# Patient Record
Sex: FEMALE | Race: WHITE
[De-identification: names, ages, dates, MRNs, and addresses within clinical notes are randomized per-mention and may not be internally consistent; named-entity substitution may affect disease eponyms.]

---

## 2017-09-15 ENCOUNTER — HOSPITAL ENCOUNTER (OUTPATIENT)
Dept: HOSPITAL 45 - C.LABSPEC | Age: 42
Discharge: HOME | End: 2017-09-15
Attending: GENERAL PRACTICE
Payer: COMMERCIAL

## 2017-09-15 DIAGNOSIS — F11.90: Primary | ICD-10-CM

## 2017-09-15 LAB
BENZODIAZ UR-MCNC: (no result) UG/L
PCP UR-MCNC: (no result) UG/L

## 2017-09-20 ENCOUNTER — HOSPITAL ENCOUNTER (EMERGENCY)
Dept: HOSPITAL 45 - C.EDB | Age: 42
Discharge: HOME | End: 2017-09-20
Payer: COMMERCIAL

## 2017-09-20 VITALS
WEIGHT: 248.46 LBS | BODY MASS INDEX: 39.93 KG/M2 | HEIGHT: 65.98 IN | HEIGHT: 65.98 IN | BODY MASS INDEX: 39.93 KG/M2 | WEIGHT: 248.46 LBS

## 2017-09-20 VITALS — SYSTOLIC BLOOD PRESSURE: 169 MMHG | OXYGEN SATURATION: 96 % | DIASTOLIC BLOOD PRESSURE: 98 MMHG | HEART RATE: 79 BPM

## 2017-09-20 VITALS — TEMPERATURE: 98.24 F

## 2017-09-20 DIAGNOSIS — Z82.49: ICD-10-CM

## 2017-09-20 DIAGNOSIS — E87.6: ICD-10-CM

## 2017-09-20 DIAGNOSIS — R10.30: Primary | ICD-10-CM

## 2017-09-20 DIAGNOSIS — R59.1: ICD-10-CM

## 2017-09-20 DIAGNOSIS — Z98.51: ICD-10-CM

## 2017-09-20 DIAGNOSIS — F17.200: ICD-10-CM

## 2017-09-20 DIAGNOSIS — F41.9: ICD-10-CM

## 2017-09-20 DIAGNOSIS — Z79.899: ICD-10-CM

## 2017-09-20 LAB
ALP SERPL-CCNC: 116 U/L (ref 45–117)
ALT SERPL-CCNC: 27 U/L (ref 12–78)
ANION GAP SERPL CALC-SCNC: 5 MMOL/L (ref 3–11)
APPEARANCE UR: (no result)
AST SERPL-CCNC: 15 U/L (ref 15–37)
BASOPHILS # BLD: 0.01 K/UL (ref 0–0.2)
BASOPHILS NFR BLD: 0.1 %
BILIRUB UR-MCNC: (no result) MG/DL
BUN SERPL-MCNC: 3 MG/DL (ref 7–18)
BUN/CREAT SERPL: 4.6 (ref 10–20)
CALCIUM SERPL-MCNC: 9.2 MG/DL (ref 8.5–10.1)
CHLORIDE SERPL-SCNC: 101 MMOL/L (ref 98–107)
CO2 SERPL-SCNC: 31 MMOL/L (ref 21–32)
COLOR UR: YELLOW
COMPLETE: YES
CREAT CL PREDICTED SERPL C-G-VRATE: 150.2 ML/MIN
CREAT SERPL-MCNC: 0.64 MG/DL (ref 0.6–1.2)
EOSINOPHIL NFR BLD AUTO: 501 K/UL (ref 130–400)
GLUCOSE SERPL-MCNC: 93 MG/DL (ref 70–99)
HCT VFR BLD CALC: 48.6 % (ref 37–47)
IG%: 0.4 %
IMM GRANULOCYTES NFR BLD AUTO: 20.3 %
LYMPHOCYTES # BLD: 3.18 K/UL (ref 1.2–3.4)
MANUAL MICROSCOPIC REQUIRED?: NO
MCH RBC QN AUTO: 28.9 PG (ref 25–34)
MCHC RBC AUTO-ENTMCNC: 32.1 G/DL (ref 32–36)
MCV RBC AUTO: 90 FL (ref 80–100)
MONOCYTES NFR BLD: 3.8 %
NEUTROPHILS # BLD AUTO: 1 %
NEUTROPHILS NFR BLD AUTO: 74.4 %
NITRITE UR QL STRIP: (no result)
PH UR STRIP: >= 9 [PH] (ref 4.5–7.5)
PMV BLD AUTO: 8.7 FL (ref 7.4–10.4)
POTASSIUM SERPL-SCNC: 3.3 MMOL/L (ref 3.5–5.1)
RBC # BLD AUTO: 5.4 M/UL (ref 4.2–5.4)
REVIEW REQ?: NO
SODIUM SERPL-SCNC: 137 MMOL/L (ref 136–145)
SP GR UR STRIP: 1.02 (ref 1–1.03)
URINE BILL WITH OR WITHOUT MIC: (no result)
URINE EPITHELIAL CELL AUTO: >30 /LPF (ref 0–5)
UROBILINOGEN UR-MCNC: (no result) MG/DL
WBC # BLD AUTO: 15.65 K/UL (ref 4.8–10.8)

## 2017-09-20 NOTE — DIAGNOSTIC IMAGING REPORT
ABD/PELVIS WITHOUT FOR STONE



CLINICAL HISTORY: 41 years-old Female presenting with right flank pain, history

of kidney stones, nausea and vomiting since yesterday. 



TECHNIQUE: Multidetector CT of the abdomen and pelvis was performed without the

use of intravenous contrast. IV contrast: None. A dose lowering technique was

used consistent with the principles of ALARA (as low as reasonably achievable). 



COMPARISON: 5/22/2015.



CT DOSE (mGy.cm): The estimated cumulative dose is 1787.90 mGy.cm.



FINDINGS:



 topogram: Unremarkable.



Lung bases: Mosaic attenuation at the lung bases could suggest small airways

disease. Possible superimposed patchy groundglass in the lower lobes. Minimal

dependent change. Normal heart size. No pericardial or pleural effusion.



Liver: Normal morphology. Density consistent with hepatic steatosis.



Biliary: No gross biliary ductal dilatation allowing for noncontrast technique.

Normal gallbladder.



Pancreas: Mild parenchymal atrophy.



Spleen: Normal noncontrast appearance.



Adrenal glands: Normal noncontrast appearance.



Kidneys and ureters: Nonobstructing 2 mm calculus at the lower pole the left

kidney is unchanged in position since the prior exam. No right renal calculus.

No hydronephrosis. Normal ureters.



Bladder: Mild circumferential bladder wall thickening suggested.



Pelvic organs: Uterus surgically absent. Previously noted left ovarian cyst has

resolved. A right ovarian cyst may be present but is characterized without

intravenous contrast.



Bowel: Postsurgical changes of appendectomy. No bowel obstruction.



Peritoneal cavity: No free fluid or intraperitoneal gas.



Vasculature: Atherosclerosis of the normal caliber abdominal aorta.



Lymph nodes: Multiple prominent upper abdominal, retroperitoneal, external iliac

and inguinal lymph nodes, which are subcentimeter but numerous. The largest

nodes are located in the upper abdomen. An index node in the portacaval region

measures 9 mm in the short axis (series 3 image 192).



Abdominal wall: Normal.



Musculoskeletal: Normal.



IMPRESSION:

1.  The patient is status post appendectomy.



2.  Mild circumferential bladder wall thickening could suggest cystitis.

Correlate with urinalysis.



3.  Numerous prominent lymph nodes throughout the abdomen and pelvis. These are

not pathologically enlarged by CT size criteria. These may be reactive.

Follow-up as clinically warranted.



4.  Nonobstructing 2 mm calculus at the lower pole left kidney.



5.  Hepatic steatosis.



6.  Mosaic attenuation at the lung bases could suggest small airways disease.











Electronically signed by:  Alexis Justice M.D.

9/20/2017 11:04 AM



Dictated Date/Time:  9/20/2017 10:55 AM

## 2017-09-20 NOTE — EMERGENCY ROOM VISIT NOTE
History


Report prepared by Kilo:  Maeve Sánchez


Under the Supervision of:  Dr. Mynor Rojas M.D.


First contact with patient:  09:53


Chief Complaint:  FLANK PAIN


Stated Complaint:  RT KIDNEY PAIN





History of Present Illness


The patient is a 41 year old female who presents to the Emergency Room with 

complaints of constant sharp right back pain beginning yesterday. The patient's 

pain began to radiate to her front this morning. She believes her pain is from 

a kidney stone. She denies having any difficulty urinating and has no blood in 

her urine. The patient's son dropped the patient off at the ED. She denies any 

chance of being pregnant. The patient says she felt warm today but is unsure if 

she has a fever. She had three episodes of vomiting today.  Her pain does not 

radiate down her legs.





   Source of History:  patient


   Onset:  yesterday


   Position:  back (right sided)


   Symptom Intensity:  severe


   Quality:  sharp


   Timing:  constant


   Associated Symptoms:  + vomiting, No urinary symptoms





Review of Systems


See HPI for pertinent positives & negatives. A total of 10 systems reviewed and 

were otherwise negative.





Past Medical & Surgical


Medical Problems:


(1) Anxiety State Nos


(2) Lumbago


(3) Tobacco Use Disorder


Surgical Problems:


(1) Tubal Ligation Status








Family History





Kidney disease


Kidney stones





Social History


Smoking Status:  Current Every Day Smoker


Alcohol Use:  occasionally


Drug Use:  marijuana


Marital Status:  


Housing Status:  lives with family


Occupation Status:  unemployed





Current/Historical Medications


Scheduled


Alprazolam (Xanax), 1 MG PO HS


Carisoprodol (Soma), 350 MG PO QID


Cyanocobalamin (Cyanocobalamin), 1,000 MCG INJ MONTHLY


Multivitamin (Multivitamin), 1 TAB PO QAM





Scheduled PRN


Furosemide (Lasix), 40 MG PO PRN PRN for edema


Oxycodone Ir (Roxicodone Ir), 30 MG PO Q4-6HRS PRN for Pain


Oxycodone Ir (Roxicodone Ir), 5 MG PO Q6H PRN for Breakthrough Pain





Allergies


Coded Allergies:  


     No Known Allergies (Verified , 9/20/17)





Physical Exam


Vital Signs











  Date Time  Temp Pulse Resp B/P (MAP) Pulse Ox O2 Delivery O2 Flow Rate FiO2


 


9/20/17 12:26  79 18 169/98 96   


 


9/20/17 11:07  76 18 151/101 98 Room Air  


 


9/20/17 09:39 36.8 80 20 149/99 95 Room Air  











Physical Exam


The patient is writhing in pain.


Constitutional: Vital signs reviewed.


Eyes: Pupils are equal round reactive to light.  Conjunctiva are noninjected.  


ENT: Pharynx is clear without erythema or exudate.  Mucous membranes are moist.

  Neck supple without meningeal signs.


Respiratory: Clear to auscultation bilaterally.  Breath sounds are equal 

bilaterally. 


Cardiovascular: Regular rate and rhythm.  No rubs or gallops.


GI: Soft, nondistended.  Bowel sounds are present. Right lower quadrant 

tenderness, no guarding. Mild right CVA tenderness.


Musculoskeletal: No peripheral edema.  No lower extremity tenderness. 


Integumentary: No cyanosis.


Neurological: The patient is awake and alert.  No focal deficits.


Psychiatric: Normal affect.





Medical Decision & Procedures


ER Provider


Diagnostic Interpretation:


Radiology results as stated below per my review and the radiologist's 

interpretation:





ABD/PELVIS WITHOUT FOR STONE








FINDINGS:





 topogram: Unremarkable.





Lung bases: Mosaic attenuation at the lung bases could suggest small airways


disease. Possible superimposed patchy groundglass in the lower lobes. Minimal


dependent change. Normal heart size. No pericardial or pleural effusion.





Liver: Normal morphology. Density consistent with hepatic steatosis.





Biliary: No gross biliary ductal dilatation allowing for noncontrast technique.


Normal gallbladder.





Pancreas: Mild parenchymal atrophy.





Spleen: Normal noncontrast appearance.





Adrenal glands: Normal noncontrast appearance.





Kidneys and ureters: Nonobstructing 2 mm calculus at the lower pole the left


kidney is unchanged in position since the prior exam. No right renal calculus.


No hydronephrosis. Normal ureters.





Bladder: Mild circumferential bladder wall thickening suggested.





Pelvic organs: Uterus surgically absent. Previously noted left ovarian cyst has


resolved. A right ovarian cyst may be present but is characterized without


intravenous contrast.





Bowel: Postsurgical changes of appendectomy. No bowel obstruction.





Peritoneal cavity: No free fluid or intraperitoneal gas.





Vasculature: Atherosclerosis of the normal caliber abdominal aorta.





Lymph nodes: Multiple prominent upper abdominal, retroperitoneal, external iliac


and inguinal lymph nodes, which are subcentimeter but numerous. The largest


nodes are located in the upper abdomen. An index node in the portacaval region


measures 9 mm in the short axis (series 3 image 192).





Abdominal wall: Normal.





Musculoskeletal: Normal.





IMPRESSION:


1.  The patient is status post appendectomy.





2.  Mild circumferential bladder wall thickening could suggest cystitis.


Correlate with urinalysis.





3.  Numerous prominent lymph nodes throughout the abdomen and pelvis. These are


not pathologically enlarged by CT size criteria. These may be reactive.


Follow-up as clinically warranted.





4.  Nonobstructing 2 mm calculus at the lower pole left kidney.





5.  Hepatic steatosis.





6.  Mosaic attenuation at the lung bases could suggest small airways disease.





Electronically signed by:  Alexis Justice M.D.





Laboratory Results


9/20/17 09:55








Red Blood Count 5.40, Mean Corpuscular Volume 90.0, Mean Corpuscular Hemoglobin 

28.9, Mean Corpuscular Hemoglobin Concent 32.1, Mean Platelet Volume 8.7, 

Neutrophils (%) (Auto) 74.4, Lymphocytes (%) (Auto) 20.3, Monocytes (%) (Auto) 

3.8, Eosinophils (%) (Auto) 1.0, Basophils (%) (Auto) 0.1, Neutrophils # (Auto) 

11.65, Lymphocytes # (Auto) 3.18, Monocytes # (Auto) 0.59, Eosinophils # (Auto) 

0.16, Basophils # (Auto) 0.01





9/20/17 09:55

















Test


  9/20/17


09:55 9/20/17


10:00


 


White Blood Count


  15.65 K/uL


(4.8-10.8) 


 


 


Red Blood Count


  5.40 M/uL


(4.2-5.4) 


 


 


Hemoglobin


  15.6 g/dL


(12.0-16.0) 


 


 


Hematocrit 48.6 % (37-47)  


 


Mean Corpuscular Volume


  90.0 fL


() 


 


 


Mean Corpuscular Hemoglobin


  28.9 pg


(25-34) 


 


 


Mean Corpuscular Hemoglobin


Concent 32.1 g/dl


(32-36) 


 


 


Platelet Count


  501 K/uL


(130-400) 


 


 


Mean Platelet Volume


  8.7 fL


(7.4-10.4) 


 


 


Neutrophils (%) (Auto) 74.4 %  


 


Lymphocytes (%) (Auto) 20.3 %  


 


Monocytes (%) (Auto) 3.8 %  


 


Eosinophils (%) (Auto) 1.0 %  


 


Basophils (%) (Auto) 0.1 %  


 


Neutrophils # (Auto)


  11.65 K/uL


(1.4-6.5) 


 


 


Lymphocytes # (Auto)


  3.18 K/uL


(1.2-3.4) 


 


 


Monocytes # (Auto)


  0.59 K/uL


(0.11-0.59) 


 


 


Eosinophils # (Auto)


  0.16 K/uL


(0-0.5) 


 


 


Basophils # (Auto)


  0.01 K/uL


(0-0.2) 


 


 


RDW Standard Deviation


  45.0 fL


(36.4-46.3) 


 


 


RDW Coefficient of Variation


  13.8 %


(11.5-14.5) 


 


 


Immature Granulocyte % (Auto) 0.4 %  


 


Immature Granulocyte # (Auto)


  0.06 K/uL


(0.00-0.02) 


 


 


Anion Gap


  5.0 mmol/L


(3-11) 


 


 


Est Creatinine Clear Calc


Drug Dose 150.2 ml/min 


  


 


 


Estimated GFR (


American) 130.3 


  


 


 


Estimated GFR (Non-


American 112.4 


  


 


 


BUN/Creatinine Ratio 4.6 (10-20)  


 


Calcium Level


  9.2 mg/dl


(8.5-10.1) 


 


 


Total Bilirubin


  0.5 mg/dl


(0.2-1) 


 


 


Direct Bilirubin  mg/dl (0-0.2)  


 


Aspartate Amino Transf


(AST/SGOT) 15 U/L (15-37) 


  


 


 


Alanine Aminotransferase


(ALT/SGPT) 27 U/L (12-78) 


  


 


 


Alkaline Phosphatase


  116 U/L


() 


 


 


Total Protein


  8.4 gm/dl


(6.4-8.2) 


 


 


Albumin


  4.0 gm/dl


(3.4-5.0) 


 


 


Lipase


  52 U/L


() 


 


 


Chemistry Specimen Hemolysis   


 


Urine Color  YELLOW 


 


Urine Appearance  TURBID (CLEAR) 


 


Urine pH


  


  >= 9.0


(4.5-7.5)


 


Urine Specific Gravity


  


  1.016


(1.000-1.030)


 


Urine Protein  NEG (NEG) 


 


Urine Glucose (UA)  NEG (NEG) 


 


Urine Ketones  NEG (NEG) 


 


Urine Occult Blood  NEG (NEG) 


 


Urine Nitrite  NEG (NEG) 


 


Urine Bilirubin  NEG (NEG) 


 


Urine Urobilinogen  NEG (NEG) 


 


Urine Leukocyte Esterase  NEG (NEG) 


 


Urine WBC (Auto)  1-5 /hpf (0-5) 


 


Urine RBC (Auto)  0-4 /hpf (0-4) 


 


Urine Hyaline Casts (Auto)  0 /lpf (0-5) 


 


Urine Epithelial Cells (Auto)  >30 /lpf (0-5) 


 


Urine Bacteria (Auto)  NEG (NEG) 


 


Urine Pregnancy Test  NEG (NEG) 











Medications Administered











 Medications


  (Trade)  Dose


 Ordered  Sig/Doron


 Route  Start Time


 Stop Time Status Last Admin


Dose Admin


 


 Morphine Sulfate


  (MoRPHine


 SULFATE INJ)  4 mg  ONE  STAT


 IV  9/20/17 10:09


 9/20/17 10:11 DC 9/20/17 10:18


4 MG


 


 Ondansetron HCl


  (Zofran Inj)  4 mg  NOW  STAT


 IV  9/20/17 10:09


 9/20/17 10:11 DC 9/20/17 10:18


4 MG


 


 Sodium Chloride  1,000 ml @ 


 999 mls/hr  Q1H1M STAT


 IV  9/20/17 10:09


 9/20/17 11:09 DC 9/20/17 10:18


999 MLS/HR


 


 Ketorolac


 Tromethamine


  (Toradol Inj)  10 mg  NOW  STAT


 IV  9/20/17 11:12


 9/20/17 11:14 DC 9/20/17 11:19


10 MG











ED Course


0955: The patient was evaluated in room B5. A complete history and physical 

exam was performed.





1009: Sodium Chloride 1000 ml @ 999 mls/hr IV, Zofran Inj 4 mg IV, Morphine 

Sulfate 4 mg IV.





1105: I reevaluated the patient and discussed her test results. She is still in 

pain.





1112: Toradol Inj 10 mg IV.





1210: The patient is feeling better. I advised her to follow up with her PCP.





1217: Upon reevaluation, the patient appeared to have improvement of her 

symptoms. I discussed tonight's findings with her. She verbalized agreement of 

the treatment plan. The patient was discharged home.





Medical Decision


This is a 41-year-old female presents with right flank pain.  Differential 

diagnosis includes ureterolithiasis, hydronephrosis, musculoskeletal pain, 

ectopic pregnancy, UTI.  I did perform a limited focused review of portions of 

the patient's old chart on the electronic medical record. The patient was seen 

in the ED September 2016. She was told she had an intrarenal kidney stone at 

[facility]. The patient came to [facility] ED and had KUB which was unremarkable. 





I did evaluate the patient as noted above. The patient is presenting with right 

flank pain which she states feels identical to kidney stone pain.  She does 

have tenderness in the right lower quadrant. IV access was established.  I did 

treat patient with IV morphine and Zofran.  She is also given normal saline IV.

  I did order and personally review the patient's urine analysis as described 

above.  There is no blood or signs of infection.  Urine pregnancy test is 

negative.  I did order and review the patient's blood work as noted in the 

electronic medical record.  Her white blood cell count is elevated.  Potassium 

is slightly decreased.  I did order a CT of the abdomen and pelvis.  I did 

review the images myself as well as the radiology report as described above.  

There is no evidence of acute process.  She does have some abdominal 

lymphadenopathy which does appear to be reactive.  She has a left intrarenal 

stone but no ureteral calculi.  I did reassess the patient and discussed the 

test results with her in detail.  I did treat her with Toradol 10 mg IV.  On 

reassessment the patient states she feels better.  She still has some pain but 

Toradol seemed to improve her symptoms more than the morphine.  I did recommend 

she follow closely with her doctor for further evaluation.  She was discharged 

in good condition.  She does have oxycodone at home for pain as needed.





[place] Drug Monitoring Program


Search Results:  patient reviewed within database


Drug Monitoring Findings:


The patient received two prescriptions of 5mg and 30 mg 70 count oxycodone on 

September 15th as well as a Soma.





Medication Reconcilliation


Current Medication List:  was personally reviewed by me





Blood Pressure Screening


Patient's blood pressure:  Elevated blood pressure


Blood pressure disposition:  Referred to PCP





Impression





 Primary Impression:  


 Right flank pain


 Additional Impressions:  


 Abdominal lymphadenopathy


 Hypokalemia





Scribe Attestation


The scribe's documentation has been prepared under my direct and personally 

reviewed by me in its entirety. I confirm that the note above accurately 

reflects all work, treatment, procedures, and medical decision making performed 

by me.





Departure Information


Dispostion


Home / Self-Care





Referrals


No Doctor, Assigned (PCP)





Forms


HOME CARE DOCUMENTATION FORM,                                                 

               IMPORTANT VISIT INFORMATION





Patient Instructions


ED Flank Pain Uncertain Cause, My [facility]





Additional Instructions





You have been examined and treated today on an emergency basis only. This is 

not a substitute for, or an effort to provide, complete comprehensive medical 

care. It is impossible to recognize and treat all injuries or illnesses in a 

single emergency department visit. It is therefore important that you follow up 

closely with your physician.  Call as soon as possible for an appointment.  

Return for worsening symptoms or if you develop fever, problems urinating, or 

any other concerning symptoms.





Problem Qualifiers

## 2018-02-10 ENCOUNTER — HOSPITAL ENCOUNTER (EMERGENCY)
Dept: HOSPITAL 45 - C.EDB | Age: 43
Discharge: HOME | End: 2018-02-10
Payer: COMMERCIAL

## 2018-02-10 VITALS
WEIGHT: 235.45 LBS | BODY MASS INDEX: 37.84 KG/M2 | BODY MASS INDEX: 37.84 KG/M2 | HEIGHT: 65.98 IN | HEIGHT: 65.98 IN | WEIGHT: 235.45 LBS

## 2018-02-10 VITALS — SYSTOLIC BLOOD PRESSURE: 139 MMHG | DIASTOLIC BLOOD PRESSURE: 92 MMHG | HEART RATE: 75 BPM | OXYGEN SATURATION: 97 %

## 2018-02-10 VITALS — TEMPERATURE: 98.06 F

## 2018-02-10 DIAGNOSIS — F41.9: ICD-10-CM

## 2018-02-10 DIAGNOSIS — Z79.899: ICD-10-CM

## 2018-02-10 DIAGNOSIS — E66.9: ICD-10-CM

## 2018-02-10 DIAGNOSIS — F17.200: ICD-10-CM

## 2018-02-10 DIAGNOSIS — N83.202: Primary | ICD-10-CM

## 2018-02-10 DIAGNOSIS — Z84.1: ICD-10-CM

## 2018-02-10 LAB
ALBUMIN SERPL-MCNC: 3.8 GM/DL (ref 3.4–5)
ALP SERPL-CCNC: 94 U/L (ref 45–117)
ALT SERPL-CCNC: 14 U/L (ref 12–78)
AST SERPL-CCNC: 7 U/L (ref 15–37)
BASOPHILS # BLD: 0.01 K/UL (ref 0–0.2)
BASOPHILS NFR BLD: 0.2 %
BUN SERPL-MCNC: 5 MG/DL (ref 7–18)
CALCIUM SERPL-MCNC: 9 MG/DL (ref 8.5–10.1)
CO2 SERPL-SCNC: 27 MMOL/L (ref 21–32)
CREAT SERPL-MCNC: 0.62 MG/DL (ref 0.6–1.2)
EOS ABS #: 0.15 K/UL (ref 0–0.5)
EOSINOPHIL NFR BLD AUTO: 312 K/UL (ref 130–400)
GLUCOSE SERPL-MCNC: 96 MG/DL (ref 70–99)
HCT VFR BLD CALC: 41.8 % (ref 37–47)
HGB BLD-MCNC: 15.3 G/DL (ref 12–16)
IG#: 0 K/UL (ref 0–0.02)
IMM GRANULOCYTES NFR BLD AUTO: 21.8 %
LYMPHOCYTES # BLD: 1.38 K/UL (ref 1.2–3.4)
MCH RBC QN AUTO: 32.5 PG (ref 25–34)
MCHC RBC AUTO-ENTMCNC: 36.6 G/DL (ref 32–36)
MCV RBC AUTO: 88.7 FL (ref 80–100)
MONO ABS #: 0.37 K/UL (ref 0.11–0.59)
MONOCYTES NFR BLD: 5.8 %
NEUT ABS #: 4.43 K/UL (ref 1.4–6.5)
NEUTROPHILS # BLD AUTO: 2.4 %
NEUTROPHILS NFR BLD AUTO: 69.8 %
PMV BLD AUTO: 9.3 FL (ref 7.4–10.4)
POTASSIUM SERPL-SCNC: 3.6 MMOL/L (ref 3.5–5.1)
PROT SERPL-MCNC: 7.6 GM/DL (ref 6.4–8.2)
RED CELL DISTRIBUTION WIDTH CV: 13.4 % (ref 11.5–14.5)
RED CELL DISTRIBUTION WIDTH SD: 43.6 FL (ref 36.4–46.3)
SODIUM SERPL-SCNC: 141 MMOL/L (ref 136–145)
WBC # BLD AUTO: 6.34 K/UL (ref 4.8–10.8)

## 2018-02-10 NOTE — DIAGNOSTIC IMAGING REPORT
PELVIC COMPLETE NON OB



CLINICAL HISTORY: 42 years-old Female presenting with RLQ pain, s/p appy,

hyster.  Ovarian cyst, status post partial hysterectomy. 



TECHNIQUE: Real-time grayscale and color and spectral Doppler ultrasound imaging

of the pelvis was performed first using a transabdominal probe and subsequently

transvaginal for better characterization. 



COMPARISON: 2/7/2007 and CT from 9/20/2017.



FINDINGS: 

Uterus: The patient is status post hysterectomy.  



Right adnexa: Right ovary contains 2 adjacent simple appearing cysts one

measuring 2.4 x 2.1 x 1.7 cm and the second measuring 2.3 x 1.2 x 1.4 cm. Right

ovary measures 3.9 x 2.4 x 2.7 cm. Normal color Doppler flow and arterial and

venous waveforms within the ovarian parenchyma.    



Left adnexa: Left ovary expanded by a simple appearing anechoic 4.4 x 2.8 x 2.7

cm. Left ovary measures 5.1 x 3.4 x 3.3 cm. Normal color Doppler flow and

arterial and venous waveforms within the ovarian parenchyma.    



Other: No free fluid.



IMPRESSION:  

1.  Bilateral simple appearing cysts the largest in the left ovary measuring 5.1

cm. These are almost certainly benign in the premenopausal setting. Follow-up

ultrasound in 6-12 weeks recommended by the Society of radiologists in

ultrasound criteria. No evidence of ovarian torsion.



2.  Status post hysterectomy.







Electronically signed by:  Alexis Justice M.D.

2/10/2018 10:45 AM



Dictated Date/Time:  2/10/2018 10:40 AM

## 2018-02-10 NOTE — EMERGENCY ROOM VISIT NOTE
History


Report prepared by Kilo:  Lisa Osorio


Under the Supervision of:  Dr. Mary Kay Mas M.D.


First contact with patient:  08:26


Chief Complaint:  ABDOMINAL PAIN


Stated Complaint:  PAIN IN LOWER STOMACH


Nursing Triage Summary:  


Patient presents with right lower quadrant pain since september 2017, states 

she 


was evaluated by PCP and blood work is "fine"





States the pain has been worse for the last days





States heating pad improves symptoms





States bending and movement exacerbates pain





Also c/o right hip numbness and swelling





History of Present Illness


The patient is a 42 year old female who presents to the Emergency Room with 

complaints of worsening lower quadrant abdominal pain that began in 08/17. The 

patient states that she has been seen for similar symptoms, noting she is 

unsure what has been causing her pain. She reports that her pain medication has 

not been relieving her symptoms recently. The patient states that she feels 

like the pain is coming from her ovaries and radiates to her lower back, noting 

it does not feel like cramps. She notes that her discomfort does not feel like 

cramping and denies any urinary symptoms. The patient reports that she had a 

hysterectomy about 10 years ago, noting that she still has her ovaries and 

fallopian tubes.





   Source of History:  patient


   Onset:  08/17


   Position:  abdomen


   Quality:  other (abdominal pain)


   Timing:  other (persistent)


   Associated Symptoms:  + back pain (pain radiates to lower back), No urinary 

symptoms





Review of Systems


See HPI for pertinent positives & negatives. A total of 10 systems reviewed and 

were otherwise negative.





Past Medical & Surgical


Medical Problems:


(1) Anxiety State Nos


(2) Lumbago


(3) Tobacco Use Disorder


Surgical Problems:


(1) Tubal Ligation Status








Family History





Kidney disease


Kidney stones





Social History


Smoking Status:  Current Every Day Smoker


Alcohol Use:  occasionally


Drug Use:  marijuana


Marital Status:  


Housing Status:  lives with family


Occupation Status:  unemployed





Current/Historical Medications


Scheduled


Carisoprodol (Soma), 350 MG PO QID


Gabapentin (Neurontin), 300 MG PO 3-4xd


Levothyroxine Sodium (Levothyroxine Sodium), 1 TAB PO DAILY


Multivitamin (Multivitamin), 1 TAB PO QAM





Scheduled PRN


Alprazolam (Xanax), 1 MG PO HS PRN for Anxiety/Insomnia


Furosemide (Lasix), 40 MG PO DAILY PRN for edema


Oxycodone Ir (Roxicodone Ir), 30 MG PO Q4-6HRS PRN for Pain


Oxycodone Ir (Roxicodone Ir), 5 MG PO Q6H PRN for Breakthrough Pain


Tramadol (Ultram), 1 TABS PO Q6 PRN for Pain





Allergies


Coded Allergies:  


     No Known Allergies (Verified , 9/20/17)





Physical Exam


Vital Signs











  Date Time  Temp Pulse Resp B/P (MAP) Pulse Ox O2 Delivery O2 Flow Rate FiO2


 


2/10/18 11:36  75 18 139/92 97   


 


2/10/18 11:04  79 18 148/95 98 Room Air  


 


2/10/18 10:02  82 18 150/99 99   


 


2/10/18 08:23 36.7 92 16 148/87 96 Room Air  











Physical Exam


Vital signs reviewed.





General: Well-appearing female, in no significant distress.





HEENT: No scleral icterus, PERRLA, neck supple.  Atraumatic.





Cardiovascular: Regular rate and rhythm, no extra sounds.





Pulmonary: Clear to auscultation bilaterally, normal work of breathing.





Abdomen: Tenderness to right lower quadrant, obese, soft, nondistended, 

positive bowel sounds.





Musculoskeletal: Atraumatic, no peripheral edema.  No CVA tenderness.





Neurologic: Patient awake alert and oriented x 3





Skin: Warm, dry, no rash





Medical Decision & Procedures


ER Provider


Diagnostic Interpretation:


Radiology results as stated below per my review and radiologist interpretation:





PELVIC COMPLETE NON OB





CLINICAL HISTORY: 42 years-old Female presenting with RLQ pain, s/p appy,


hyster.  Ovarian cyst, status post partial hysterectomy. 





TECHNIQUE: Real-time grayscale and color and spectral Doppler ultrasound imaging


of the pelvis was performed first using a transabdominal probe and subsequently


transvaginal for better characterization. 





COMPARISON: 2/7/2007 and CT from 9/20/2017.





FINDINGS: 


Uterus: The patient is status post hysterectomy.  





Right adnexa: Right ovary contains 2 adjacent simple appearing cysts one


measuring 2.4 x 2.1 x 1.7 cm and the second measuring 2.3 x 1.2 x 1.4 cm. Right


ovary measures 3.9 x 2.4 x 2.7 cm. Normal color Doppler flow and arterial and


venous waveforms within the ovarian parenchyma.    





Left adnexa: Left ovary expanded by a simple appearing anechoic 4.4 x 2.8 x 2.7


cm. Left ovary measures 5.1 x 3.4 x 3.3 cm. Normal color Doppler flow and


arterial and venous waveforms within the ovarian parenchyma.    





Other: No free fluid.





IMPRESSION:  


1.  Bilateral simple appearing cysts the largest in the left ovary measuring 5.1


cm. These are almost certainly benign in the premenopausal setting. Follow-up


ultrasound in 6-12 weeks recommended by the Society of radiologists in


ultrasound criteria. No evidence of ovarian torsion.





2.  Status post hysterectomy.





Electronically signed by:  Alexis Justice M.D.


2/10/2018 10:45 AM





Dictated Date/Time:  2/10/2018 10:40 AM





Laboratory Results


2/10/18 08:45








Red Blood Count 4.71, Mean Corpuscular Volume 88.7, Mean Corpuscular Hemoglobin 

32.5, Mean Corpuscular Hemoglobin Concent 36.6, Mean Platelet Volume 9.3, 

Neutrophils (%) (Auto) 69.8, Lymphocytes (%) (Auto) 21.8, Monocytes (%) (Auto) 

5.8, Eosinophils (%) (Auto) 2.4, Basophils (%) (Auto) 0.2, Neutrophils # (Auto) 

4.43, Lymphocytes # (Auto) 1.38, Monocytes # (Auto) 0.37, Eosinophils # (Auto) 

0.15, Basophils # (Auto) 0.01





2/10/18 08:45

















Test


  2/10/18


08:34 2/10/18


08:45


 


Urine Color YELLOW  


 


Urine Appearance CLEAR (CLEAR)  


 


Urine pH 6.5 (4.5-7.5)  


 


Urine Specific Gravity


  1.020


(1.000-1.030) 


 


 


Urine Protein NEG (NEG)  


 


Urine Glucose (UA) NEG (NEG)  


 


Urine Ketones NEG (NEG)  


 


Urine Occult Blood NEG (NEG)  


 


Urine Nitrite NEG (NEG)  


 


Urine Bilirubin NEG (NEG)  


 


Urine Urobilinogen NEG (NEG)  


 


Urine Leukocyte Esterase NEG (NEG)  


 


White Blood Count


  


  6.34 K/uL


(4.8-10.8)


 


Red Blood Count


  


  4.71 M/uL


(4.2-5.4)


 


Hemoglobin


  


  15.3 g/dL


(12.0-16.0)


 


Hematocrit  41.8 % (37-47) 


 


Mean Corpuscular Volume


  


  88.7 fL


()


 


Mean Corpuscular Hemoglobin


  


  32.5 pg


(25-34)


 


Mean Corpuscular Hemoglobin


Concent 


  36.6 g/dl


(32-36)


 


Platelet Count


  


  312 K/uL


(130-400)


 


Mean Platelet Volume


  


  9.3 fL


(7.4-10.4)


 


Neutrophils (%) (Auto)  69.8 % 


 


Lymphocytes (%) (Auto)  21.8 % 


 


Monocytes (%) (Auto)  5.8 % 


 


Eosinophils (%) (Auto)  2.4 % 


 


Basophils (%) (Auto)  0.2 % 


 


Neutrophils # (Auto)


  


  4.43 K/uL


(1.4-6.5)


 


Lymphocytes # (Auto)


  


  1.38 K/uL


(1.2-3.4)


 


Monocytes # (Auto)


  


  0.37 K/uL


(0.11-0.59)


 


Eosinophils # (Auto)


  


  0.15 K/uL


(0-0.5)


 


Basophils # (Auto)


  


  0.01 K/uL


(0-0.2)


 


RDW Standard Deviation


  


  43.6 fL


(36.4-46.3)


 


RDW Coefficient of Variation


  


  13.4 %


(11.5-14.5)


 


Immature Granulocyte % (Auto)  0.0 % 


 


Immature Granulocyte # (Auto)


  


  0.00 K/uL


(0.00-0.02)


 


Anion Gap


  


  6.0 mmol/L


(3-11)


 


Est Creatinine Clear Calc


Drug Dose 


  149.1 ml/min 


 


 


Estimated GFR (


American) 


  130.7 


 


 


Estimated GFR (Non-


American 


  112.8 


 


 


BUN/Creatinine Ratio  7.8 (10-20) 


 


Calcium Level


  


  9.0 mg/dl


(8.5-10.1)


 


Total Bilirubin


  


  0.4 mg/dl


(0.2-1)


 


Direct Bilirubin


  


  < 0.1 mg/dl


(0-0.2)


 


Aspartate Amino Transf


(AST/SGOT) 


  7 U/L (15-37) 


 


 


Alanine Aminotransferase


(ALT/SGPT) 


  14 U/L (12-78) 


 


 


Alkaline Phosphatase


  


  94 U/L


()


 


Total Protein


  


  7.6 gm/dl


(6.4-8.2)


 


Albumin


  


  3.8 gm/dl


(3.4-5.0)











Medications Administered











 Medications


  (Trade)  Dose


 Ordered  Sig/Doron


 Route  Start Time


 Stop Time Status Last Admin


Dose Admin


 


 Sodium Chloride  1,000 ml @ 


 999 mls/hr  Q1H1M STAT


 IV  2/10/18 08:39


 2/10/18 09:39 DC 2/10/18 09:18


999 MLS/HR


 


 Ketorolac


 Tromethamine


  (Toradol Inj)  30 mg  NOW  STAT


 IV  2/10/18 08:39


 2/10/18 08:42 DC 2/10/18 09:18


30 MG


 


 Tramadol HCl


  (Ultram Tab)  50 mg  NOW  STAT


 PO  2/10/18 10:53


 2/10/18 10:54 DC 2/10/18 11:00


50 MG











ED Course


0835: Past medical records reviewed. The patient was evaluated in room B12. A 

complete history and physical examination was performed. 





0839: Ordered Toradol Inj 30mg IV and Sodium Chloride 1000ml @ 999mls/hr IV> 





1053: Ordered Ultram Tab 50mg PO.





1102: Upon reevaluation, the patient was eating Doritos and drinking [place] 

Dew. The patient appeared to have improvement of her symptoms. I discussed 

findings with her. She verbalized agreement of the treatment plan. The patient 

was discharged home.





1209: Patient's prescription was cancelled because she gets Oxycodone from 

[place].





Medical Decision


Differential diagnosis:


Etiologies such as appendicitis, diverticulitis, PUD, biliary pathology, UTI, 

pancreatitis, obstruction, mesenteric ischemia, aortic pathology, infections, 

inflammatory bowel disease, renal colic, as well as others were entertained. 








This patient was evaluated and appeared to be in no significant distress.  IV 

access was obtained and laboratory work was drawn.  Patient was hydrated with 

normal saline solution, given IV Toradol for her discomfort.  Patient's 

laboratory work is fairly unrevealing, UA is negative.  Ultrasound of the 

pelvis reveals bilateral ovarian cyst, status post hysterectomy.  UA is 

negative.  The patient was advised of the findings.  She was given a 

prescription for Ultram to be used as needed.  I later received a phone call 

from the pharmacy stating that she receives narcotics from the pain clinic on a 

monthly basis.  The Ultram prescription was canceled.  The patient was referred 

to OB/GYN for further management.  She will return to the ER for worsening of 

symptoms or any medical concerns.





Medication Reconcilliation


Current Medication List:  was personally reviewed by me





Blood Pressure Screening


Patient's blood pressure:  Elevated blood pressure


Blood pressure disposition:  Elevated BP felt to be situational





Impression





 Primary Impression:  


 Ovarian cyst





Scribe Attestation


The scribe's documentation has been prepared under my direction and personally 

reviewed by me in its entirety. I confirm that the note above accurately 

reflects all work, treatment, procedures, and medical decision making performed 

by me.





Departure Information


Dispostion


Home / Self-Care





Prescriptions





Tramadol (Ultram) 50 Mg Tab


1 TABS PO Q6 Y for Pain, #30 TAB


   Prov: Mary Kay Mas M.D.         2/10/18





Referrals


Shabana Hernandez D.O. (PCP)





Forms


Call Back Authorization, HOME CARE DOCUMENTATION FORM,                         

                                       IMPORTANT VISIT INFORMATION





Patient Instructions


My [facility]





Additional Instructions





Diagnosis: Bilateral ovarian cysts





Ibuprofen 600 mg every 6 hours as needed for pain with food.





Ultram 50 mg every 6 hours as needed for more severe pain.





Follow-up with OB/GYN within the next several weeks for reevaluation.  Please 

see Dr. Aguirre's contact information below.





Return to the emergency department for worsening of symptoms or any medical 

concerns.